# Patient Record
Sex: FEMALE | Race: OTHER | Employment: UNEMPLOYED | URBAN - METROPOLITAN AREA
[De-identification: names, ages, dates, MRNs, and addresses within clinical notes are randomized per-mention and may not be internally consistent; named-entity substitution may affect disease eponyms.]

---
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
## 2018-04-29 ENCOUNTER — HOSPITAL ENCOUNTER (EMERGENCY)
Facility: HOSPITAL | Age: 18
Discharge: HOME | End: 2018-04-29
Attending: EMERGENCY MEDICINE
Payer: MEDICAID

## 2018-04-29 VITALS
RESPIRATION RATE: 18 BRPM | BODY MASS INDEX: 22 KG/M2 | SYSTOLIC BLOOD PRESSURE: 106 MMHG | OXYGEN SATURATION: 97 % | DIASTOLIC BLOOD PRESSURE: 81 MMHG | TEMPERATURE: 99 F | HEART RATE: 84 BPM | WEIGHT: 120 LBS

## 2018-04-29 DIAGNOSIS — T74.21XA SEXUAL ASSAULT OF ADULT, INITIAL ENCOUNTER: Primary | ICD-10-CM

## 2018-04-29 PROCEDURE — 81025 URINE PREGNANCY TEST: CPT

## 2018-04-29 PROCEDURE — 87591 N.GONORRHOEAE DNA AMP PROB: CPT | Performed by: EMERGENCY MEDICINE

## 2018-04-29 PROCEDURE — 99283 EMERGENCY DEPT VISIT LOW MDM: CPT

## 2018-04-29 PROCEDURE — 87808 TRICHOMONAS ASSAY W/OPTIC: CPT | Performed by: EMERGENCY MEDICINE

## 2018-04-29 PROCEDURE — 96372 THER/PROPH/DIAG INJ SC/IM: CPT

## 2018-04-29 PROCEDURE — 87389 HIV-1 AG W/HIV-1&-2 AB AG IA: CPT | Performed by: EMERGENCY MEDICINE

## 2018-04-29 PROCEDURE — 87106 FUNGI IDENTIFICATION YEAST: CPT | Performed by: EMERGENCY MEDICINE

## 2018-04-29 PROCEDURE — 87205 SMEAR GRAM STAIN: CPT | Performed by: EMERGENCY MEDICINE

## 2018-04-29 PROCEDURE — 87491 CHLMYD TRACH DNA AMP PROBE: CPT | Performed by: EMERGENCY MEDICINE

## 2018-04-29 RX ORDER — METRONIDAZOLE 500 MG/1
2000 TABLET ORAL ONCE
Status: COMPLETED | OUTPATIENT
Start: 2018-04-29 | End: 2018-04-29

## 2018-04-29 RX ORDER — AZITHROMYCIN 250 MG/1
1000 TABLET, FILM COATED ORAL ONCE
Status: COMPLETED | OUTPATIENT
Start: 2018-04-29 | End: 2018-04-29
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
## 2018-04-29 NOTE — ED NOTES
Pt dcd to home with instruction and with resources information, Nabil Has the advocate also provided information to pt, pt voices understanding, denies any concern

## 2018-04-29 NOTE — ED NOTES
As per Jaclyn Stephens RN, pt is outside of 7 day collection period thus no evidence collection will be performed, explained to pt and voices understanding.
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
## 2020-06-04 ENCOUNTER — HOSPITAL ENCOUNTER (EMERGENCY)
Facility: HOSPITAL | Age: 20
Discharge: HOME | End: 2020-06-04
Attending: EMERGENCY MEDICINE
Payer: COMMERCIAL

## 2020-06-04 VITALS
SYSTOLIC BLOOD PRESSURE: 112 MMHG | DIASTOLIC BLOOD PRESSURE: 66 MMHG | OXYGEN SATURATION: 97 % | TEMPERATURE: 99 F | HEART RATE: 66 BPM | RESPIRATION RATE: 16 BRPM
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
## 2020-06-04 PROCEDURE — 99283 EMERGENCY DEPT VISIT LOW MDM: CPT

## 2020-06-04 RX ORDER — IBUPROFEN 600 MG/1
600 TABLET ORAL ONCE
Status: COMPLETED | OUTPATIENT
Start: 2020-06-04 | End: 2020-06-04

## 2020-06-04 RX ORDER — MELOXICAM 7.5 MG/1
7.5 TABLET ORAL DAILY PRN
Qty: 14 TABLET | Refills: 0 | Status: SHIPPED | OUTPATIENT
Start: 2020-06-04 | End: 2020-06-18

## 2020-06-04 RX ORDER — ORPHENADRINE CITRATE 100 MG/1
100 TABLET, EXTENDED RELEASE ORAL 2 TIMES DAILY PRN
Qty: 20 TABLET | Refills: 0 | Status: SHIPPED | OUTPATIENT
Start: 2020-06-04 | End: 2020-06-14
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
## 2021-06-01 ENCOUNTER — APPOINTMENT (OUTPATIENT)
Dept: CT IMAGING | Facility: HOSPITAL | Age: 21
End: 2021-06-01
Attending: NURSE PRACTITIONER
Payer: COMMERCIAL

## 2021-06-01 ENCOUNTER — HOSPITAL ENCOUNTER (EMERGENCY)
Facility: HOSPITAL | Age: 21
Discharge: HOME | End: 2021-06-01
Payer: COMMERCIAL

## 2021-06-01 VITALS
RESPIRATION RATE: 18 BRPM | DIASTOLIC BLOOD PRESSURE: 78 MMHG | WEIGHT: 140 LBS | BODY MASS INDEX: 26 KG/M2 | SYSTOLIC BLOOD PRESSURE: 111 MMHG | HEART RATE: 76 BPM | TEMPERATURE: 98 F | OXYGEN SATURATION: 98 %

## 2021-06-01 DIAGNOSIS — S09.90XA INJURY OF HEAD, INITIAL ENCOUNTER: Primary | ICD-10-CM

## 2021-06-01 PROCEDURE — 99284 EMERGENCY DEPT VISIT MOD MDM: CPT

## 2021-06-01 PROCEDURE — 70450 CT HEAD/BRAIN W/O DYE: CPT | Performed by: NURSE PRACTITIONER
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
## 2021-06-01 NOTE — ED PROVIDER NOTES
Patient Seen in: [facility] Emergency Department    History   CC: headache  HPI: Denisse Anton 23year old female  who presents c/o headache which is constant in nature however varying in intensity over the last 2 days status post injury at 12:50 A scalp, or facial bones, + parietal scalp tenderness on palpation throughout, TMJ bilat without crepitus or limited ROM  Eyes - PERRL, sclera not injected, no discharge noted, no periorbital edema, no pain with EOM  ENT - EAC bilaterally without discharge, is transmitted to the [facility] ([address] [place] [address] of Radiology) [address]) which includes the   Dose Index Registry.       FINDINGS:   CSF SPACES: Ventricles, cisterns, and sulci are appropriate for age.  No hydrocephalus, subarachno

## 2021-06-14 ENCOUNTER — IMMUNIZATION (OUTPATIENT)
Dept: LAB | Facility: HOSPITAL | Age: 21
End: 2021-06-14
Attending: EMERGENCY MEDICINE
Payer: COMMERCIAL

## 2021-06-14 DIAGNOSIS — Z23 NEED FOR VACCINATION: Primary | ICD-10-CM

## 2021-06-14 PROCEDURE — 0001A SARSCOV2 VAC 30MCG/0.3ML IM: CPT

## 2021-07-10 ENCOUNTER — IMMUNIZATION (OUTPATIENT)
Dept: LAB | Facility: HOSPITAL | Age: 21
End: 2021-07-10
Attending: EMERGENCY MEDICINE
Payer: COMMERCIAL

## 2021-07-10 DIAGNOSIS — Z23 NEED FOR VACCINATION: Primary | ICD-10-CM

## 2021-07-10 PROCEDURE — 0002A SARSCOV2 VAC 30MCG/0.3ML IM: CPT

## (undated) NOTE — LETTER
Date & Time: 6/1/2021, 4:22 PM  Patient: Martínez Watson  Encounter Provider(s):    NETO Chris       To Whom It May Concern: Martínez Watson was seen and treated in our department on 6/1/2021.  She should not return to work until 06/04/21 or

## (undated) NOTE — ED AVS SNAPSHOT
Heather Rodgers   MRN: [id number]    Department:  [facility] Emergency Department   Date of Visit:  4/29/2018           Disclosure     Insurance plans vary and the physician(s) referred by the ER may not be covered by your plan.  Please contact y CARE PHYSICIAN AT ONCE OR RETURN IMMEDIATELY TO THE EMERGENCY DEPARTMENT. If you have been prescribed any medication(s), please fill your prescription right away and begin taking the medication(s) as directed.   If you believe that any of the medications

## (undated) NOTE — ED AVS SNAPSHOT
[facility] Emergency Department    [address]     [place] [address]    Phone:  [phone]    Fax:  [phone]           Kiersten Shawn   MRN: [id number]    Department:  [facility] Emergency Department   Date of Visit:  2/4/2 and Class Registration line at [phone] or find a doctor online by visiting [email or web address].    IF THERE IS ANY CHANGE OR WORSENING OF YOUR CONDITION, CALL YOUR PRIMARY CARE PHYSICIAN AT ONCE OR RETURN IMMEDIATELY TO [address] [place] [address].     If

## (undated) NOTE — ED AVS SNAPSHOT
[facility] Emergency Department    [address]     [place] [address]    Phone:  [phone]    Fax:  [phone]           Mayank Ya   MRN: [id number]    Department:  [facility] Emergency Department   Date of Visit:  2/4/2 coverage and benefits available for follow-up care and referrals. If you have difficulty scheduling your follow-up appointment as directed, please call our  at [phone].      Si tiene problemas para programar romana carmelo de seguimiento s different from what your Primary Care doctor has instructed you - please continue to take your medications as instructed by your Primary Care doctor until you can check with your doctor. Please bring the medication list to your next doctor's appointment. and ask to get set up for an insurance coverage that is in-network with [employer] [facility]. [employer]     Sign up for [employer], your secure online medical record.   [employer] will allow you to access patient instructions from your recent visit,  view

## (undated) NOTE — LETTER
Date & Time: 6/4/2020, 3:04 AM  Patient: Alexandr Donohue  Encounter Provider(s):    Dianna Siegel MD       To Whom It May Concern: Alexandr Donohue was seen and treated in our department on 6/4/2020. She should not return to work until 6/6/2020.     If